# Patient Record
Sex: MALE
[De-identification: names, ages, dates, MRNs, and addresses within clinical notes are randomized per-mention and may not be internally consistent; named-entity substitution may affect disease eponyms.]

---

## 2022-06-09 ENCOUNTER — APPOINTMENT (OUTPATIENT)
Dept: ORTHOPEDIC SURGERY | Facility: CLINIC | Age: 42
End: 2022-06-09
Payer: COMMERCIAL

## 2022-06-09 ENCOUNTER — NON-APPOINTMENT (OUTPATIENT)
Age: 42
End: 2022-06-09

## 2022-06-09 VITALS — BODY MASS INDEX: 22.53 KG/M2 | OXYGEN SATURATION: 100 % | WEIGHT: 170 LBS | HEIGHT: 73 IN | HEART RATE: 117 BPM

## 2022-06-09 PROCEDURE — 73564 X-RAY EXAM KNEE 4 OR MORE: CPT | Mod: RT

## 2022-06-09 PROCEDURE — 99204 OFFICE O/P NEW MOD 45 MIN: CPT

## 2022-06-09 NOTE — DISCUSSION/SUMMARY
[de-identified] : BISI ASTUDILLO is a 41 year y/o male who presents for initial visit of Right (R) knee. He presents with complaints to a new right knee nodule. He states he noticed it a month ago, but it may have been present for longer. He states he has no pain with palpation, bending or walking. The nodule is located next to the medial aspect of the patella, and is soft-tissue like in nature. Patient reports no specific MABEL or acute trauma to joint. Patient denies numbness and tingling to the extremities. He denies changes of sensation down leg or to foot. He denies prior knee injury. No nocturnal awakenings.\par \par We had a thorough discussion regarding the nature of his pain, the pathophysiology, as well as all treatment options. Patient has been kneeling a lot, to bathe his children. Based on his clinical exam, and radiographs, he has ganglionic cyst or mass for which advance imaging is necessary. At this time, patient elected for US of knee to assess underlying derangement, and r/o mass. An order for this was placed to be performed by Dr. Gambino. He will keep and eye for any infection or scabbing over mass site, and should call us immediately. All questions were answered and the patient verbalized understanding. The patient is in agreement with this treatment plan.

## 2022-06-09 NOTE — ADDENDUM
[FreeTextEntry1] : Documented by Arvin Mares acting as a scribe for Dr. Braun and Jose Luis Carranza PA-C on 06/09/2022. \par \par All medical record entries made by the Scribe were at my, Dr. Braun, and Jose Luis Carranza's, direction and\par personally dictated by me on 06/09/2022. I have reviewed the chart and agree that the record\par accurately reflects my personal performance of the history, physical exam, procedure and imaging.

## 2022-06-09 NOTE — PHYSICAL EXAM
[de-identified] : Physical Exam:\par General: Well appearing, no acute distress\par Neurologic: A&Ox3, No focal deficits\par Head: NCAT without abrasions, lacerations, or ecchymosis to head, face, or scalp\par Eyes: No scleral icterus, no gross abnormalities\par Respiratory: Equal chest wall expansion bilaterally, no accessory muscle use\par Lymphatic: No lymphadenopathy palpated\par Skin: Warm and dry\par Psychiatric: Normal mood and affect\par \par Right Knee: Range of Motion in Degrees	\par 	  Claimant:	Normal:	\par Flexion Active	 135 	 135-degrees	\par Flexion Passive	 135	 135-degrees	\par Extension Active	 0-5	 0-5-degrees	\par Extension Passive	 0-5	 0-5-degrees	\par \par Palpable mass by medial JL, that is mobile. Questionable ganglonic cyst. No weakness to flexion/extension. No evidence of instability in the AP plane or varus or valgus stress. Negative Lachman. Negative pivot shift. Negative anterior drawer test. Negative posterior drawer test. Negative Heather. Negative Apley grind. negative Boyd test. No medial or lateral joint line tenderness. No tenderness over the medial and lateral facet of the patella. No patellofemoral crepitations. No lateral tilting patella. No patellar apprehension. No crepitation in the medial and lateral femoral condyle. No proximal or distal swelling, edema or tenderness. No gross motor or sensory deficits. No intra-articular swelling. 2+ DP and PT pulses. No varus or valgus malalignment. Skin is intact. No rashes, scars or lesions.\par \par Left Knee: Range of Motion in Degrees	\par 	  Claimant:	Normal:	\par Flexion Active	 135 	 135-degrees	\par Flexion Passive	 135	 135-degrees	\par Extension Active	 0-5	 0-5-degrees	\par Extension Passive	 0-5	 0-5-degrees	\par \par No weakness to flexion/extension. No evidence of instability in the AP plane or varus or valgus stress. Negative Lachman. Negative pivot shift. Negative anterior drawer test. Negative posterior drawer test. Negative Heather. Negative Apley grind. Negative Boyd test. No medial or lateral joint line tenderness. No tenderness over the medial and lateral facet of the patella. No patellofemoral crepitations. No lateral tilting patella. No patellar apprehension. No crepitation in the medial and lateral femoral condyle. No proximal or distal swelling, edema or tenderness. No gross motor or sensory deficits. No intra-articular swelling. 2+ DP and PT pulses. No varus or valgus malalignment. Skin is intact. No rashes, scars or lesions.  [de-identified] : The following radiographs were ordered and read by me during this patients visit. I reviewed each radiograph in detail with the patient and discussed the findings as highlighted below. \par \par 4 views of the Right (R) knee show oval shaped fluid filled sac noted by robyn COUGHLIN, no fracture, dislocation or bony lesions. There is no evidence of degenerative change in the medial, lateral and patellofemoral compartments with maintenance of the joint space. Otherwise unremarkable.

## 2022-06-09 NOTE — HISTORY OF PRESENT ILLNESS
[Stable] : stable [___ mths] : [unfilled] month(s) ago [0] : a maximum pain level of 0/10 [None] : No exacerbating factors are noted [de-identified] : BISI ASTUDILLO is a 41 year y/o male who presents for initial visit of Right (R) knee. He presents with complaints to a new right knee nodule. He states he noticed it a month ago, but it may have been present for longer. He states he has no pain with palpation, bending or walking. The nodule is located next to the medial aspect of the patella, and is soft-tissue like in nature. Patient reports no specific MABEL or acute trauma to joint. Patient denies numbness and tingling to the extremities. He denies changes of sensation down leg or to foot. He denies prior knee injury. No nocturnal awakenings.\par

## 2022-06-30 ENCOUNTER — RESULT REVIEW (OUTPATIENT)
Age: 42
End: 2022-06-30

## 2022-06-30 ENCOUNTER — APPOINTMENT (OUTPATIENT)
Dept: ULTRASOUND IMAGING | Facility: CLINIC | Age: 42
End: 2022-06-30

## 2022-06-30 ENCOUNTER — OUTPATIENT (OUTPATIENT)
Dept: OUTPATIENT SERVICES | Facility: HOSPITAL | Age: 42
LOS: 1 days | End: 2022-06-30
Payer: COMMERCIAL

## 2022-06-30 DIAGNOSIS — M25.861 OTHER SPECIFIED JOINT DISORDERS, RIGHT KNEE: ICD-10-CM

## 2022-06-30 PROCEDURE — 76882 US LMTD JT/FCL EVL NVASC XTR: CPT

## 2022-06-30 PROCEDURE — 76882 US LMTD JT/FCL EVL NVASC XTR: CPT | Mod: 26,RT

## 2022-07-01 ENCOUNTER — APPOINTMENT (OUTPATIENT)
Dept: ORTHOPEDIC SURGERY | Facility: CLINIC | Age: 42
End: 2022-07-01

## 2022-07-01 PROCEDURE — 99442: CPT

## 2022-07-05 ENCOUNTER — NON-APPOINTMENT (OUTPATIENT)
Age: 42
End: 2022-07-05

## 2022-07-06 ENCOUNTER — NON-APPOINTMENT (OUTPATIENT)
Age: 42
End: 2022-07-06

## 2022-07-07 ENCOUNTER — NON-APPOINTMENT (OUTPATIENT)
Age: 42
End: 2022-07-07

## 2022-08-11 ENCOUNTER — APPOINTMENT (OUTPATIENT)
Dept: ORTHOPEDIC SURGERY | Facility: CLINIC | Age: 42
End: 2022-08-11

## 2022-08-11 VITALS — OXYGEN SATURATION: 99 % | BODY MASS INDEX: 22.53 KG/M2 | HEIGHT: 73 IN | WEIGHT: 170 LBS

## 2022-08-11 PROCEDURE — 99214 OFFICE O/P EST MOD 30 MIN: CPT

## 2022-08-14 NOTE — ADDENDUM
[FreeTextEntry1] : Documented by Katarina Ho acting as a scribe for Dr. Braun and Jose Luis Carranza PA-C on 08/11/2022. \par \par All medical record entries made by the Scribe were at my, Dr. Braun, and Jose Luis Carranza's, direction and\par personally dictated by me on 08/11/2022. I have reviewed the chart and agree that the record\par accurately reflects my personal performance of the history, physical exam, procedure and imaging.

## 2022-08-14 NOTE — HISTORY OF PRESENT ILLNESS
[0] : an average pain level of 0/10 [None] : No exacerbating factors are noted [Stable] : stable [de-identified] : BISI ASTUDILLO is a 42 year male being seen for f/u visit R knee nodule. He reports no pain at this time. He has no complaints. He presents for US review.

## 2022-08-14 NOTE — PHYSICAL EXAM
[de-identified] : Physical Exam:\par General: Well appearing, no acute distress\par Neurologic: A&Ox3, No focal deficits\par Head: NCAT without abrasions, lacerations, or ecchymosis to head, face, or scalp\par Eyes: No scleral icterus, no gross abnormalities\par Respiratory: Equal chest wall expansion bilaterally, no accessory muscle use\par Lymphatic: No lymphadenopathy palpated\par Skin: Warm and dry\par Psychiatric: Normal mood and affect \par \par Right Knee: Range of Motion in Degrees	\par 	 Claimant:	Normal:	\par Flexion Active	 135 	 135-degrees	\par Flexion Passive	 135	 135-degrees	\par Extension Active	 0-5	 0-5-degrees	\par Extension Passive	 0-5	 0-5-degrees	\par \par Palpable mass by medial JL, that is mobile. Questionable ganglonic cyst. No weakness to flexion/extension. No evidence of instability in the AP plane or varus or valgus stress. Negative Lachman. Negative pivot shift. Negative anterior drawer test. Negative posterior drawer test. Negative Heather. Negative Apley grind. negative Boyd test. No medial or lateral joint line tenderness. No tenderness over the medial and lateral facet of the patella. No patellofemoral crepitations. No lateral tilting patella. No patellar apprehension. No crepitation in the medial and lateral femoral condyle. No proximal or distal swelling, edema or tenderness. No gross motor or sensory deficits. No intra-articular swelling. 2+ DP and PT pulses. No varus or valgus malalignment. Skin is intact. No rashes, scars or lesions.\par \par Left Knee: Range of Motion in Degrees	\par 	 Claimant:	Normal:	\par Flexion Active	 135 	 135-degrees	\par Flexion Passive	 135	 135-degrees	\par Extension Active	 0-5	 0-5-degrees	\par Extension Passive	 0-5	 0-5-degrees	\par \par No weakness to flexion/extension. No evidence of instability in the AP plane or varus or valgus stress. Negative Lachman. Negative pivot shift. Negative anterior drawer test. Negative posterior drawer test. Negative Heather. Negative Apley grind. Negative Boyd test. No medial or lateral joint line tenderness. No tenderness over the medial and lateral facet of the patella. No patellofemoral crepitations. No lateral tilting patella. No patellar apprehension. No crepitation in the medial and lateral femoral condyle. No proximal or distal swelling, edema or tenderness. No gross motor or sensory deficits. No intra-articular swelling. 2+ DP and PT pulses. No varus or valgus malalignment. Skin is intact. No rashes, scars or lesions. \par  [de-identified] : US Joint Nonvasc Exremity\par \par PROCEDURE DATE:  06/30/2022\par \par INTERPRETATION:  Clinical indication: Nodule along the medial aspect of the patella of uncertain etiology.\par \par Ultrasound evaluation of the right knee localized to the area of palpable concern was performed.\par \par Impression:\par \par Within the subcutaneous fat medial to the patella there is a ovoid hypoechoic nodule measuring approximately 0.6 x 0.2 x 0.6 cm. There is no internal flow. There is a questionable small tail extending to the cutaneous surface. This may be related to a sebaceous cyst but is indeterminate. Further characterization with an MRI of the knee with contrast is recommended.\par

## 2022-08-14 NOTE — DISCUSSION/SUMMARY
[de-identified] : BISI ASUTDILLO is a 42 year male being seen for f/u visit R knee nodule. He reports no pain at this time. He has no complaints. He presents for US review. \par \par We had a thorough discussion regarding the nature of his pain, the pathophysiology, as well as all treatment options. Based on the clinical exam and imaging, a MRI with and without contrast is indicated. Considering the patient's current presentation of pain, physical exam, and radiographs an MRI is indicated at this time. A prescription for this was given to the patient. We will go over the MRI results with him upon obtaining the results in the office and advise him of further treatment options. He agrees with the above plan and all questions were answered.

## 2022-08-24 ENCOUNTER — OUTPATIENT (OUTPATIENT)
Dept: OUTPATIENT SERVICES | Facility: HOSPITAL | Age: 42
LOS: 1 days | End: 2022-08-24
Payer: COMMERCIAL

## 2022-08-24 ENCOUNTER — APPOINTMENT (OUTPATIENT)
Dept: MRI IMAGING | Facility: CLINIC | Age: 42
End: 2022-08-24

## 2022-08-24 DIAGNOSIS — Z00.8 ENCOUNTER FOR OTHER GENERAL EXAMINATION: ICD-10-CM

## 2022-08-24 PROCEDURE — 73721 MRI JNT OF LWR EXTRE W/O DYE: CPT | Mod: 26,RT

## 2022-08-24 PROCEDURE — 73721 MRI JNT OF LWR EXTRE W/O DYE: CPT

## 2022-09-01 ENCOUNTER — TRANSCRIPTION ENCOUNTER (OUTPATIENT)
Age: 42
End: 2022-09-01

## 2022-09-02 ENCOUNTER — APPOINTMENT (OUTPATIENT)
Dept: ORTHOPEDIC SURGERY | Facility: CLINIC | Age: 42
End: 2022-09-02

## 2022-09-02 PROCEDURE — 99441: CPT

## 2022-09-12 ENCOUNTER — NON-APPOINTMENT (OUTPATIENT)
Age: 42
End: 2022-09-12

## 2022-09-13 ENCOUNTER — APPOINTMENT (OUTPATIENT)
Dept: ORTHOPEDIC SURGERY | Facility: CLINIC | Age: 42
End: 2022-09-13

## 2022-09-13 VITALS — HEIGHT: 73 IN | BODY MASS INDEX: 22.53 KG/M2 | WEIGHT: 170 LBS

## 2022-09-13 DIAGNOSIS — M25.861 OTHER SPECIFIED JOINT DISORDERS, RIGHT KNEE: ICD-10-CM

## 2022-09-13 PROCEDURE — 99203 OFFICE O/P NEW LOW 30 MIN: CPT

## 2022-09-13 NOTE — REASON FOR VISIT
[FreeTextEntry1] : Presented for evaluation of painless subcutaneous cyst over the anteromedial aspect of the right knee

## 2022-09-13 NOTE — PHYSICAL EXAM
[FreeTextEntry1] : Physical exam revealed a healthy looking patient in no apparent distress patient appears to be fully alert oriented having no significant complaints examination of the right knee demonstrate full range of motion no swelling no joint effusion there is a tiny subcutaneous cyst just medial to the kneecap measuring about 8 mm in diameter suggest to be a possible tiny localized ganglion cyst at the same time and patient had MRI scan of the knee which demonstrated subcutaneous cyst and at the same time incidental finding of some fluid collection behind the medial femoral condyle suggest to be a possible localized cyst at this stage patient was recommended to be followed conservatively and to be seen as needed

## 2022-09-13 NOTE — HISTORY OF PRESENT ILLNESS
[FreeTextEntry1] : This is the first visit of a 42 years old male recently noted a painless subcutaneous soft tissue mass over the medial aspect of the right knee area measuring about 8 mm in diameter no history of trauma or injury.

## 2022-12-06 ENCOUNTER — OFFICE (OUTPATIENT)
Dept: URBAN - METROPOLITAN AREA CLINIC 12 | Facility: CLINIC | Age: 42
Setting detail: OPHTHALMOLOGY
End: 2022-12-06
Payer: COMMERCIAL

## 2022-12-06 DIAGNOSIS — H00.14: ICD-10-CM

## 2022-12-06 PROCEDURE — 99203 OFFICE O/P NEW LOW 30 MIN: CPT | Performed by: OPHTHALMOLOGY

## 2022-12-06 ASSESSMENT — KERATOMETRY
OS_K2POWER_DIOPTERS: 43.75
OD_K1POWER_DIOPTERS: 43.25
OD_AXISANGLE_DEGREES: 054
OD_K2POWER_DIOPTERS: 43.50
OS_K1POWER_DIOPTERS: 43.25
OS_AXISANGLE_DEGREES: 084

## 2022-12-06 ASSESSMENT — REFRACTION_AUTOREFRACTION
OS_AXIS: 005
OS_SPHERE: +0.25
OS_CYLINDER: -0.50
OD_AXIS: 149
OD_SPHERE: +0.50
OD_CYLINDER: -0.25

## 2022-12-06 ASSESSMENT — SPHEQUIV_DERIVED
OD_SPHEQUIV: 0.375
OS_SPHEQUIV: 0

## 2022-12-06 ASSESSMENT — VISUAL ACUITY
OS_BCVA: 20/20
OD_BCVA: 20/20

## 2022-12-06 ASSESSMENT — CONFRONTATIONAL VISUAL FIELD TEST (CVF)
OS_FINDINGS: FULL
OD_FINDINGS: FULL

## 2022-12-06 ASSESSMENT — TONOMETRY
OS_IOP_MMHG: 19
OD_IOP_MMHG: 21

## 2022-12-06 ASSESSMENT — AXIALLENGTH_DERIVED
OD_AL: 23.4921
OS_AL: 23.5919

## 2022-12-20 ENCOUNTER — OFFICE (OUTPATIENT)
Dept: URBAN - METROPOLITAN AREA CLINIC 100 | Facility: CLINIC | Age: 42
Setting detail: OPHTHALMOLOGY
End: 2022-12-20
Payer: COMMERCIAL

## 2022-12-20 DIAGNOSIS — H00.14: ICD-10-CM

## 2022-12-20 DIAGNOSIS — H01.001: ICD-10-CM

## 2022-12-20 DIAGNOSIS — H01.005: ICD-10-CM

## 2022-12-20 DIAGNOSIS — H01.004: ICD-10-CM

## 2022-12-20 DIAGNOSIS — H01.002: ICD-10-CM

## 2022-12-20 PROCEDURE — 99213 OFFICE O/P EST LOW 20 MIN: CPT | Performed by: OPHTHALMOLOGY

## 2022-12-20 ASSESSMENT — AXIALLENGTH_DERIVED
OS_AL: 23.5919
OD_AL: 23.4921

## 2022-12-20 ASSESSMENT — REFRACTION_AUTOREFRACTION
OD_SPHERE: +0.50
OS_CYLINDER: -0.50
OS_AXIS: 005
OS_SPHERE: +0.25
OD_CYLINDER: -0.25
OD_AXIS: 149

## 2022-12-20 ASSESSMENT — VISUAL ACUITY
OD_BCVA: 20/20
OS_BCVA: 20/20

## 2022-12-20 ASSESSMENT — LID EXAM ASSESSMENTS
OS_BLEPHARITIS: LLL LUL 1+
OD_BLEPHARITIS: RLL RUL 1+

## 2022-12-20 ASSESSMENT — KERATOMETRY
OS_K2POWER_DIOPTERS: 43.75
OD_K2POWER_DIOPTERS: 43.50
OD_K1POWER_DIOPTERS: 43.25
OD_AXISANGLE_DEGREES: 054
OS_K1POWER_DIOPTERS: 43.25
OS_AXISANGLE_DEGREES: 084

## 2022-12-20 ASSESSMENT — SPHEQUIV_DERIVED
OS_SPHEQUIV: 0
OD_SPHEQUIV: 0.375

## 2022-12-20 ASSESSMENT — CONFRONTATIONAL VISUAL FIELD TEST (CVF)
OS_FINDINGS: FULL
OD_FINDINGS: FULL

## 2023-06-05 ENCOUNTER — NON-APPOINTMENT (OUTPATIENT)
Age: 43
End: 2023-06-05

## 2023-06-06 ENCOUNTER — APPOINTMENT (OUTPATIENT)
Dept: OTOLARYNGOLOGY | Facility: CLINIC | Age: 43
End: 2023-06-06
Payer: COMMERCIAL

## 2023-06-06 VITALS — WEIGHT: 165 LBS | TEMPERATURE: 98.1 F | BODY MASS INDEX: 21.87 KG/M2 | HEIGHT: 73 IN

## 2023-06-06 DIAGNOSIS — Z78.9 OTHER SPECIFIED HEALTH STATUS: ICD-10-CM

## 2023-06-06 PROCEDURE — 99203 OFFICE O/P NEW LOW 30 MIN: CPT

## 2023-06-06 NOTE — ASSESSMENT
[FreeTextEntry1] : left tonsil crypt and concretion\par rec self cleaning as demonstrated\par fu prn

## 2023-06-06 NOTE — HISTORY OF PRESENT ILLNESS
[de-identified] : co crevice left tonsil\par food sticking in left tonsil one area\par no inflammation

## 2023-06-06 NOTE — PHYSICAL EXAM
[Midline] : trachea located in midline position [de-identified] : left tonsil w isolated crypt and retained debrs [Normal] : no rashes

## 2024-03-07 ENCOUNTER — APPOINTMENT (OUTPATIENT)
Dept: ULTRASOUND IMAGING | Facility: CLINIC | Age: 44
End: 2024-03-07

## 2025-06-21 ENCOUNTER — NON-APPOINTMENT (OUTPATIENT)
Age: 45
End: 2025-06-21

## 2025-06-23 ENCOUNTER — APPOINTMENT (OUTPATIENT)
Dept: OPHTHALMOLOGY | Facility: CLINIC | Age: 45
End: 2025-06-23
Payer: COMMERCIAL

## 2025-06-23 ENCOUNTER — NON-APPOINTMENT (OUTPATIENT)
Age: 45
End: 2025-06-23

## 2025-06-23 PROCEDURE — 92004 COMPRE OPH EXAM NEW PT 1/>: CPT

## 2025-06-23 PROCEDURE — 92133 CPTRZD OPH DX IMG PST SGM ON: CPT
